# Patient Record
Sex: FEMALE | Race: WHITE | Employment: OTHER | RURAL
[De-identification: names, ages, dates, MRNs, and addresses within clinical notes are randomized per-mention and may not be internally consistent; named-entity substitution may affect disease eponyms.]

---

## 2017-02-17 RX ORDER — SIMVASTATIN 20 MG/1
20 TABLET, FILM COATED ORAL
Qty: 30 TAB | Refills: 0 | Status: SHIPPED | OUTPATIENT
Start: 2017-02-17 | End: 2017-06-08 | Stop reason: SDUPTHER

## 2017-04-12 RX ORDER — METOPROLOL TARTRATE 50 MG/1
50 TABLET ORAL 2 TIMES DAILY
Qty: 180 TAB | Refills: 1 | Status: SHIPPED | OUTPATIENT
Start: 2017-04-12

## 2017-06-09 RX ORDER — SIMVASTATIN 20 MG/1
20 TABLET, FILM COATED ORAL
Qty: 90 TAB | Refills: 1 | Status: SHIPPED | OUTPATIENT
Start: 2017-06-09 | End: 2018-02-24 | Stop reason: SDUPTHER

## 2017-06-09 RX ORDER — OMEPRAZOLE 20 MG/1
20 CAPSULE, DELAYED RELEASE ORAL DAILY
Qty: 90 CAP | Refills: 1 | Status: SHIPPED | OUTPATIENT
Start: 2017-06-09 | End: 2018-02-24 | Stop reason: SDUPTHER

## 2017-06-09 RX ORDER — CLOPIDOGREL BISULFATE 75 MG/1
75 TABLET ORAL DAILY
Qty: 90 TAB | Refills: 1 | Status: SHIPPED | OUTPATIENT
Start: 2017-06-09 | End: 2018-02-24 | Stop reason: SDUPTHER

## 2017-06-09 RX ORDER — AMLODIPINE BESYLATE 5 MG/1
5 TABLET ORAL DAILY
Qty: 90 TAB | Refills: 1 | Status: SHIPPED | OUTPATIENT
Start: 2017-06-09 | End: 2018-02-24 | Stop reason: SDUPTHER

## 2017-06-21 RX ORDER — NITROGLYCERIN 0.4 MG/1
0.4 TABLET SUBLINGUAL
Qty: 30 TAB | Refills: 2 | Status: SHIPPED | OUTPATIENT
Start: 2017-06-21 | End: 2018-09-28 | Stop reason: SDUPTHER

## 2018-08-27 ENCOUNTER — TELEPHONE (OUTPATIENT)
Dept: FAMILY MEDICINE CLINIC | Age: 68
End: 2018-08-27

## 2018-10-10 ENCOUNTER — DOCUMENTATION ONLY (OUTPATIENT)
Dept: CARDIOLOGY CLINIC | Age: 68
End: 2018-10-10

## 2018-10-10 NOTE — PROGRESS NOTES
Per fax request (2 identifiers) - last ofc note and ekg faxed to:    [facility]  ATTN:  Marine Ganser, FNP-BC  [address] [place] [address], [facility]   [phone]  Fax  [phone]

## 2021-08-04 ENCOUNTER — HOSPITAL ENCOUNTER (OUTPATIENT)
Dept: PREADMISSION TESTING | Age: 71
Discharge: HOME | End: 2021-08-04
Payer: MEDICARE

## 2021-08-04 ENCOUNTER — HOSPITAL ENCOUNTER (OUTPATIENT)
Dept: GENERAL RADIOLOGY | Age: 71
Discharge: HOME | End: 2021-08-04
Attending: INTERNAL MEDICINE
Payer: MEDICARE

## 2021-08-04 VITALS
TEMPERATURE: 98.1 F | WEIGHT: 167 LBS | RESPIRATION RATE: 18 BRPM | BODY MASS INDEX: 28.51 KG/M2 | DIASTOLIC BLOOD PRESSURE: 76 MMHG | HEIGHT: 64 IN | SYSTOLIC BLOOD PRESSURE: 114 MMHG | HEART RATE: 50 BPM

## 2021-08-04 LAB
ANION GAP SERPL CALC-SCNC: 3 MMOL/L (ref 5–15)
BUN SERPL-MCNC: 23 MG/DL (ref 6–20)
BUN/CREAT SERPL: 24 (ref 12–20)
CA-I BLD-MCNC: 9 MG/DL (ref 8.5–10.1)
CHLORIDE SERPL-SCNC: 107 MMOL/L (ref 97–108)
CO2 SERPL-SCNC: 32 MMOL/L (ref 21–32)
CREAT SERPL-MCNC: 0.94 MG/DL (ref 0.55–1.02)
ERYTHROCYTE [DISTWIDTH] IN BLOOD BY AUTOMATED COUNT: 13.2 % (ref 11.5–14.5)
GLUCOSE SERPL-MCNC: 92 MG/DL (ref 65–100)
HCT VFR BLD AUTO: 38.3 % (ref 35–47)
HGB BLD-MCNC: 11.7 G/DL (ref 11.5–16)
MCH RBC QN AUTO: 29 PG (ref 26–34)
MCHC RBC AUTO-ENTMCNC: 30.5 G/DL (ref 30–36.5)
MCV RBC AUTO: 94.8 FL (ref 80–99)
NRBC # BLD: 0 K/UL (ref 0–0.01)
NRBC BLD-RTO: 0 PER 100 WBC
PLATELET # BLD AUTO: 118 K/UL (ref 150–400)
PMV BLD AUTO: 11.9 FL (ref 8.9–12.9)
POTASSIUM SERPL-SCNC: 3.9 MMOL/L (ref 3.5–5.1)
RBC # BLD AUTO: 4.04 M/UL (ref 3.8–5.2)
SODIUM SERPL-SCNC: 142 MMOL/L (ref 136–145)
WBC # BLD AUTO: 4.1 K/UL (ref 3.6–11)

## 2021-08-04 PROCEDURE — 71046 X-RAY EXAM CHEST 2 VIEWS: CPT

## 2021-08-04 PROCEDURE — 86901 BLOOD TYPING SEROLOGIC RH(D): CPT

## 2021-08-04 PROCEDURE — 36415 COLL VENOUS BLD VENIPUNCTURE: CPT

## 2021-08-04 PROCEDURE — 85027 COMPLETE CBC AUTOMATED: CPT

## 2021-08-04 PROCEDURE — 86923 COMPATIBILITY TEST ELECTRIC: CPT

## 2021-08-04 PROCEDURE — 80048 BASIC METABOLIC PNL TOTAL CA: CPT

## 2021-08-04 RX ORDER — ISOSORBIDE MONONITRATE 60 MG/1
60 TABLET, EXTENDED RELEASE ORAL
COMMUNITY

## 2021-08-04 NOTE — PROGRESS NOTES
Noted in Dr Lesli Yadav office note pt needs BMP and Chest xray, and will also need iodine contrast allergy prep. Called and spoke to RAUL Mariee RN at Dr. Geno Mccoy office to confirm and it was not on the order, verbal order given for chest xray and BMP. She will call Dr Koko Cox and find out about prep for iodine allergy.

## 2021-08-04 NOTE — PROGRESS NOTES
Dr Jerri Chandler office called and stated predinsone will be called into [facility] and instructions for patient use, pt made aware.

## 2021-08-04 NOTE — PROGRESS NOTES
Pt states has no warning when passes out due to heart issues going on, please stand with pt and assist her to stand or transfer.

## 2021-08-05 NOTE — PROGRESS NOTES
Spoke with Marcia Alvarenga at Dr Teja Wilkinson office,  regarding abnormal labs  Platelet 712  Bun 23  BUN/Creatinine 24

## 2021-08-09 ENCOUNTER — APPOINTMENT (OUTPATIENT)
Dept: GENERAL RADIOLOGY | Age: 71
End: 2021-08-09
Attending: INTERNAL MEDICINE
Payer: MEDICARE

## 2021-08-09 ENCOUNTER — HOSPITAL ENCOUNTER (OUTPATIENT)
Age: 71
Setting detail: OBSERVATION
Discharge: HOME | End: 2021-08-10
Attending: INTERNAL MEDICINE | Admitting: INTERNAL MEDICINE
Payer: MEDICARE

## 2021-08-09 DIAGNOSIS — T82.128A: ICD-10-CM

## 2021-08-09 DIAGNOSIS — T82.110D PACEMAKER LEAD MALFUNCTION, SUBSEQUENT ENCOUNTER: Primary | ICD-10-CM

## 2021-08-09 PROBLEM — T82.110A PACEMAKER LEAD MALFUNCTION: Status: ACTIVE | Noted: 2021-08-09

## 2021-08-09 LAB
ATRIAL RATE: 62 BPM
ATRIAL RATE: 67 BPM
CALCULATED P AXIS, ECG09: 106 DEGREES
CALCULATED P AXIS, ECG09: 55 DEGREES
CALCULATED R AXIS, ECG10: 29 DEGREES
CALCULATED R AXIS, ECG10: 41 DEGREES
CALCULATED T AXIS, ECG11: 22 DEGREES
CALCULATED T AXIS, ECG11: 9 DEGREES
DIAGNOSIS, 93000: NORMAL
DIAGNOSIS, 93000: NORMAL
GLUCOSE BLD STRIP.AUTO-MCNC: 186 MG/DL (ref 65–117)
P-R INTERVAL, ECG05: 156 MS
P-R INTERVAL, ECG05: 158 MS
PERFORMED BY, TECHID: ABNORMAL
Q-T INTERVAL, ECG07: 430 MS
Q-T INTERVAL, ECG07: 448 MS
QRS DURATION, ECG06: 102 MS
QRS DURATION, ECG06: 106 MS
QTC CALCULATION (BEZET), ECG08: 454 MS
QTC CALCULATION (BEZET), ECG08: 454 MS
VENTRICULAR RATE, ECG03: 62 BPM
VENTRICULAR RATE, ECG03: 67 BPM

## 2021-08-09 PROCEDURE — 74011250637 HC RX REV CODE- 250/637: Performed by: INTERNAL MEDICINE

## 2021-08-09 PROCEDURE — 99218 HC RM OBSERVATION: CPT

## 2021-08-09 PROCEDURE — 77030018729 HC ELECTRD DEFIB PAD CARD -B: Performed by: INTERNAL MEDICINE

## 2021-08-09 PROCEDURE — 74011000250 HC RX REV CODE- 250: Performed by: INTERNAL MEDICINE

## 2021-08-09 PROCEDURE — 82962 GLUCOSE BLOOD TEST: CPT

## 2021-08-09 PROCEDURE — 99152 MOD SED SAME PHYS/QHP 5/>YRS: CPT | Performed by: INTERNAL MEDICINE

## 2021-08-09 PROCEDURE — 71045 X-RAY EXAM CHEST 1 VIEW: CPT

## 2021-08-09 PROCEDURE — 36415 COLL VENOUS BLD VENIPUNCTURE: CPT

## 2021-08-09 PROCEDURE — 74011000636 HC RX REV CODE- 636: Performed by: INTERNAL MEDICINE

## 2021-08-09 PROCEDURE — 75820 VEIN X-RAY ARM/LEG: CPT | Performed by: INTERNAL MEDICINE

## 2021-08-09 PROCEDURE — 76210000030 HC REC RM PH II 5.5 TO 6 HR: Performed by: INTERNAL MEDICINE

## 2021-08-09 PROCEDURE — 93005 ELECTROCARDIOGRAM TRACING: CPT

## 2021-08-09 PROCEDURE — 99153 MOD SED SAME PHYS/QHP EA: CPT | Performed by: INTERNAL MEDICINE

## 2021-08-09 PROCEDURE — 76210000061 HC REC RM PH II EA ADDL 0.5 >10HR: Performed by: INTERNAL MEDICINE

## 2021-08-09 PROCEDURE — 76937 US GUIDE VASCULAR ACCESS: CPT | Performed by: INTERNAL MEDICINE

## 2021-08-09 PROCEDURE — C1769 GUIDE WIRE: HCPCS | Performed by: INTERNAL MEDICINE

## 2021-08-09 PROCEDURE — 74011250636 HC RX REV CODE- 250/636: Performed by: INTERNAL MEDICINE

## 2021-08-09 RX ORDER — AMLODIPINE BESYLATE 5 MG/1
5 TABLET ORAL DAILY
Status: DISCONTINUED | OUTPATIENT
Start: 2021-08-10 | End: 2021-08-11 | Stop reason: HOSPADM

## 2021-08-09 RX ORDER — ISOSORBIDE MONONITRATE 30 MG/1
60 TABLET, EXTENDED RELEASE ORAL
Status: DISCONTINUED | OUTPATIENT
Start: 2021-08-10 | End: 2021-08-11 | Stop reason: HOSPADM

## 2021-08-09 RX ORDER — ACETAMINOPHEN 650 MG/1
650 SUPPOSITORY RECTAL
Status: DISCONTINUED | OUTPATIENT
Start: 2021-08-09 | End: 2021-08-11 | Stop reason: HOSPADM

## 2021-08-09 RX ORDER — LIDOCAINE HYDROCHLORIDE 10 MG/ML
INJECTION INFILTRATION; PERINEURAL AS NEEDED
Status: DISCONTINUED | OUTPATIENT
Start: 2021-08-09 | End: 2021-08-09 | Stop reason: HOSPADM

## 2021-08-09 RX ORDER — CLOPIDOGREL BISULFATE 75 MG/1
75 TABLET ORAL DAILY
Status: DISCONTINUED | OUTPATIENT
Start: 2021-08-10 | End: 2021-08-11 | Stop reason: HOSPADM

## 2021-08-09 RX ORDER — METOPROLOL TARTRATE 50 MG/1
50 TABLET ORAL 2 TIMES DAILY
Status: DISCONTINUED | OUTPATIENT
Start: 2021-08-09 | End: 2021-08-11 | Stop reason: HOSPADM

## 2021-08-09 RX ORDER — PANTOPRAZOLE SODIUM 40 MG/1
40 TABLET, DELAYED RELEASE ORAL DAILY
Status: DISCONTINUED | OUTPATIENT
Start: 2021-08-10 | End: 2021-08-11 | Stop reason: HOSPADM

## 2021-08-09 RX ORDER — ONDANSETRON 4 MG/1
4 TABLET, ORALLY DISINTEGRATING ORAL
Status: DISCONTINUED | OUTPATIENT
Start: 2021-08-09 | End: 2021-08-11 | Stop reason: HOSPADM

## 2021-08-09 RX ORDER — OXYCODONE HYDROCHLORIDE 5 MG/1
5 TABLET ORAL
Status: DISCONTINUED | OUTPATIENT
Start: 2021-08-09 | End: 2021-08-11 | Stop reason: HOSPADM

## 2021-08-09 RX ORDER — LISINOPRIL 5 MG/1
2.5 TABLET ORAL DAILY
Status: DISCONTINUED | OUTPATIENT
Start: 2021-08-10 | End: 2021-08-11 | Stop reason: HOSPADM

## 2021-08-09 RX ORDER — POLYETHYLENE GLYCOL 3350 17 G/17G
17 POWDER, FOR SOLUTION ORAL DAILY PRN
Status: DISCONTINUED | OUTPATIENT
Start: 2021-08-09 | End: 2021-08-11 | Stop reason: HOSPADM

## 2021-08-09 RX ORDER — ACETAMINOPHEN 325 MG/1
650 TABLET ORAL
Status: DISCONTINUED | OUTPATIENT
Start: 2021-08-09 | End: 2021-08-09 | Stop reason: SDUPTHER

## 2021-08-09 RX ORDER — NITROGLYCERIN 0.4 MG/1
0.4 TABLET SUBLINGUAL AS NEEDED
Status: DISCONTINUED | OUTPATIENT
Start: 2021-08-09 | End: 2021-08-11 | Stop reason: HOSPADM

## 2021-08-09 RX ORDER — ACETAMINOPHEN 500 MG
1000 TABLET ORAL ONCE
Status: COMPLETED | OUTPATIENT
Start: 2021-08-09 | End: 2021-08-09

## 2021-08-09 RX ORDER — DIPHENHYDRAMINE HYDROCHLORIDE 50 MG/ML
INJECTION, SOLUTION INTRAMUSCULAR; INTRAVENOUS AS NEEDED
Status: DISCONTINUED | OUTPATIENT
Start: 2021-08-09 | End: 2021-08-09 | Stop reason: HOSPADM

## 2021-08-09 RX ORDER — CITALOPRAM 10 MG/1
10 TABLET ORAL DAILY
Status: DISCONTINUED | OUTPATIENT
Start: 2021-08-10 | End: 2021-08-11 | Stop reason: HOSPADM

## 2021-08-09 RX ORDER — SIMVASTATIN 10 MG/1
20 TABLET, FILM COATED ORAL
Status: DISCONTINUED | OUTPATIENT
Start: 2021-08-09 | End: 2021-08-11 | Stop reason: HOSPADM

## 2021-08-09 RX ORDER — ALBUTEROL SULFATE 90 UG/1
1 AEROSOL, METERED RESPIRATORY (INHALATION)
Status: DISCONTINUED | OUTPATIENT
Start: 2021-08-09 | End: 2021-08-11 | Stop reason: HOSPADM

## 2021-08-09 RX ORDER — SODIUM CHLORIDE 0.9 % (FLUSH) 0.9 %
5-40 SYRINGE (ML) INJECTION EVERY 8 HOURS
Status: DISCONTINUED | OUTPATIENT
Start: 2021-08-09 | End: 2021-08-11 | Stop reason: HOSPADM

## 2021-08-09 RX ORDER — SODIUM CHLORIDE 9 MG/ML
20 INJECTION, SOLUTION INTRAVENOUS CONTINUOUS
Status: DISCONTINUED | OUTPATIENT
Start: 2021-08-09 | End: 2021-08-11 | Stop reason: HOSPADM

## 2021-08-09 RX ORDER — MIDAZOLAM HYDROCHLORIDE 1 MG/ML
INJECTION INTRAMUSCULAR; INTRAVENOUS AS NEEDED
Status: DISCONTINUED | OUTPATIENT
Start: 2021-08-09 | End: 2021-08-09 | Stop reason: HOSPADM

## 2021-08-09 RX ORDER — FENTANYL CITRATE 50 UG/ML
INJECTION, SOLUTION INTRAMUSCULAR; INTRAVENOUS AS NEEDED
Status: DISCONTINUED | OUTPATIENT
Start: 2021-08-09 | End: 2021-08-09 | Stop reason: HOSPADM

## 2021-08-09 RX ORDER — ACETAMINOPHEN 325 MG/1
650 TABLET ORAL
Status: DISCONTINUED | OUTPATIENT
Start: 2021-08-09 | End: 2021-08-11 | Stop reason: HOSPADM

## 2021-08-09 RX ORDER — ONDANSETRON 4 MG/1
4 TABLET, ORALLY DISINTEGRATING ORAL
Status: DISCONTINUED | OUTPATIENT
Start: 2021-08-09 | End: 2021-08-09 | Stop reason: SDUPTHER

## 2021-08-09 RX ORDER — ONDANSETRON 2 MG/ML
4 INJECTION INTRAMUSCULAR; INTRAVENOUS
Status: DISCONTINUED | OUTPATIENT
Start: 2021-08-09 | End: 2021-08-11 | Stop reason: HOSPADM

## 2021-08-09 RX ORDER — HYDROMORPHONE HYDROCHLORIDE 1 MG/ML
0.2 INJECTION, SOLUTION INTRAMUSCULAR; INTRAVENOUS; SUBCUTANEOUS
Status: DISCONTINUED | OUTPATIENT
Start: 2021-08-09 | End: 2021-08-11 | Stop reason: HOSPADM

## 2021-08-09 RX ORDER — SODIUM CHLORIDE 0.9 % (FLUSH) 0.9 %
5-40 SYRINGE (ML) INJECTION AS NEEDED
Status: DISCONTINUED | OUTPATIENT
Start: 2021-08-09 | End: 2021-08-11 | Stop reason: HOSPADM

## 2021-08-09 RX ORDER — AMOXICILLIN 250 MG
1 CAPSULE ORAL DAILY
Status: DISCONTINUED | OUTPATIENT
Start: 2021-08-10 | End: 2021-08-11 | Stop reason: HOSPADM

## 2021-08-09 RX ORDER — FUROSEMIDE 40 MG/1
20 TABLET ORAL DAILY
Status: DISCONTINUED | OUTPATIENT
Start: 2021-08-10 | End: 2021-08-11 | Stop reason: HOSPADM

## 2021-08-09 RX ADMIN — Medication 10 ML: at 22:34

## 2021-08-09 RX ADMIN — METOPROLOL TARTRATE 50 MG: 50 TABLET, FILM COATED ORAL at 22:33

## 2021-08-09 RX ADMIN — SIMVASTATIN 20 MG: 10 TABLET, FILM COATED ORAL at 22:30

## 2021-08-09 RX ADMIN — OXYCODONE 5 MG: 5 TABLET ORAL at 10:07

## 2021-08-09 RX ADMIN — ONDANSETRON 4 MG: 4 TABLET, ORALLY DISINTEGRATING ORAL at 10:19

## 2021-08-09 RX ADMIN — HYDROMORPHONE HYDROCHLORIDE 0.2 MG: 1 INJECTION, SOLUTION INTRAMUSCULAR; INTRAVENOUS; SUBCUTANEOUS at 13:39

## 2021-08-09 RX ADMIN — SODIUM CHLORIDE 20 ML/HR: 9 INJECTION, SOLUTION INTRAVENOUS at 07:31

## 2021-08-09 RX ADMIN — ACETAMINOPHEN 1000 MG: 500 TABLET ORAL at 07:31

## 2021-08-09 RX ADMIN — ACETAMINOPHEN 650 MG: 325 TABLET ORAL at 10:07

## 2021-08-09 RX ADMIN — Medication 10 ML: at 13:40

## 2021-08-09 RX ADMIN — HYDROMORPHONE HYDROCHLORIDE 0.2 MG: 1 INJECTION, SOLUTION INTRAMUSCULAR; INTRAVENOUS; SUBCUTANEOUS at 22:44

## 2021-08-09 RX ADMIN — ONDANSETRON 4 MG: 4 TABLET, ORALLY DISINTEGRATING ORAL at 19:34

## 2021-08-09 RX ADMIN — SODIUM CHLORIDE 1000 MG: 9 INJECTION, SOLUTION INTRAVENOUS at 08:00

## 2021-08-09 NOTE — PROGRESS NOTES
1340hrs Pt served lunch took 100% of served meal with tolerance, still complaining of pain scoring 9/10, iv dilaudid administered with tolerance will continue to monitor

## 2021-08-09 NOTE — PROGRESS NOTES
TRANSFER - OUT REPORT:    Verbal report given to [address] (on [address]  being transferred to [facility] (unit) for routine progression of care       Report consisted of patients Situation, Background, Assessment and   Recommendations(SBAR). Information from the following report(s) SBAR, Kardex, Intake/Output, MAR and Recent Results was reviewed with the receiving nurse. Lines:   Peripheral IV 08/09/21 Left Antecubital (Active)   Site Assessment Clean, dry, & intact 08/09/21 1709   Phlebitis Assessment 0 08/09/21 1709   Infiltration Assessment 0 08/09/21 1709   Dressing Status Clean, dry, & intact 08/09/21 1709   Dressing Type Transparent 08/09/21 1709   Hub Color/Line Status Patent 08/09/21 1709   Alcohol Cap Used Yes 08/09/21 1709        Opportunity for questions and clarification was provided.       Patient transported with:   Registered Nurse

## 2021-08-09 NOTE — PROGRESS NOTES
Dr Larisa Dumont in to see patient. CXR report reviewed. Patient continues to c/o pain left neck. Orders received.

## 2021-08-09 NOTE — PROGRESS NOTES
Portable CXR and EKG completed as ordered. Pain and nausea medications given. Repositioned for comfort. No acute distress noted.

## 2021-08-09 NOTE — PROGRESS NOTES
Pt received from [address] of 9/10 pain on the left neck region, Dr Monika Jimenes present ordered 0.2mg of dilaudid, pt assisted to and from the bathroom with no complications, settled in bed with call bell in reach. Lunch ordered will admin pain med once available.

## 2021-08-09 NOTE — PROGRESS NOTES
1600hrs Dr Drew Park called, gave ok to admit patient to med tele unit, nurse supervisor made aware, pt currently calm and sleeping

## 2021-08-09 NOTE — Clinical Note
A venogram was performed on the left subclavian. Injected with multiple hand injections. Injection volume  = 30 mL.

## 2021-08-09 NOTE — PROGRESS NOTES
4 eye skin assessment preformed by myself and Gaby Schwartz RN. Pt has a buries to her left lower abdomen. Pt has bug bites to her left shoulder. All other skin clean, dry and intact.

## 2021-08-09 NOTE — PROGRESS NOTES
Assumed care of the patient. Alert and responsive. Resp even and unlabored. No sob noted. Bedside report received from [address] [place] [address]. Patient complains of left neck pain 8/10. Dr. Monika Jimenes present at the bedside. Patient examined and orders received. Repositioned for comfort. VSS.

## 2021-08-10 VITALS
HEIGHT: 64 IN | RESPIRATION RATE: 18 BRPM | BODY MASS INDEX: 28.51 KG/M2 | WEIGHT: 167 LBS | HEART RATE: 64 BPM | SYSTOLIC BLOOD PRESSURE: 114 MMHG | TEMPERATURE: 97.8 F | DIASTOLIC BLOOD PRESSURE: 59 MMHG | OXYGEN SATURATION: 98 %

## 2021-08-10 LAB
GLUCOSE BLD STRIP.AUTO-MCNC: 156 MG/DL (ref 65–117)
GLUCOSE BLD STRIP.AUTO-MCNC: 159 MG/DL (ref 65–117)
PERFORMED BY, TECHID: ABNORMAL
PERFORMED BY, TECHID: ABNORMAL

## 2021-08-10 PROCEDURE — 99218 HC RM OBSERVATION: CPT

## 2021-08-10 PROCEDURE — 82962 GLUCOSE BLOOD TEST: CPT

## 2021-08-10 PROCEDURE — 74011250637 HC RX REV CODE- 250/637: Performed by: INTERNAL MEDICINE

## 2021-08-10 PROCEDURE — 74011250636 HC RX REV CODE- 250/636: Performed by: INTERNAL MEDICINE

## 2021-08-10 RX ORDER — NAPROXEN SODIUM 220 MG
220 TABLET ORAL
Qty: 10 TABLET | Refills: 0 | Status: SHIPPED
Start: 2021-08-10 | End: 2021-08-13

## 2021-08-10 RX ORDER — ASPIRIN 81 MG/1
81 TABLET ORAL DAILY
Status: DISCONTINUED | OUTPATIENT
Start: 2021-08-10 | End: 2021-08-11 | Stop reason: HOSPADM

## 2021-08-10 RX ADMIN — ONDANSETRON 4 MG: 4 TABLET, ORALLY DISINTEGRATING ORAL at 18:20

## 2021-08-10 RX ADMIN — LISINOPRIL 2.5 MG: 5 TABLET ORAL at 08:01

## 2021-08-10 RX ADMIN — OXYCODONE 5 MG: 5 TABLET ORAL at 05:32

## 2021-08-10 RX ADMIN — PANTOPRAZOLE SODIUM 40 MG: 40 TABLET, DELAYED RELEASE ORAL at 08:00

## 2021-08-10 RX ADMIN — SODIUM CHLORIDE 20 ML/HR: 9 INJECTION, SOLUTION INTRAVENOUS at 08:02

## 2021-08-10 RX ADMIN — CITALOPRAM HYDROBROMIDE 10 MG: 10 TABLET ORAL at 08:01

## 2021-08-10 RX ADMIN — HYDROMORPHONE HYDROCHLORIDE 0.2 MG: 1 INJECTION, SOLUTION INTRAMUSCULAR; INTRAVENOUS; SUBCUTANEOUS at 07:46

## 2021-08-10 RX ADMIN — Medication 10 ML: at 14:18

## 2021-08-10 RX ADMIN — HYDROMORPHONE HYDROCHLORIDE 0.2 MG: 1 INJECTION, SOLUTION INTRAMUSCULAR; INTRAVENOUS; SUBCUTANEOUS at 17:26

## 2021-08-10 RX ADMIN — OXYCODONE 5 MG: 5 TABLET ORAL at 12:31

## 2021-08-10 RX ADMIN — ASPIRIN 81 MG: 81 TABLET, COATED ORAL at 08:01

## 2021-08-10 RX ADMIN — METOPROLOL TARTRATE 50 MG: 50 TABLET, FILM COATED ORAL at 08:00

## 2021-08-10 RX ADMIN — FUROSEMIDE 20 MG: 40 TABLET ORAL at 08:00

## 2021-08-10 RX ADMIN — ISOSORBIDE MONONITRATE 60 MG: 30 TABLET, EXTENDED RELEASE ORAL at 08:00

## 2021-08-10 RX ADMIN — AMLODIPINE BESYLATE 5 MG: 5 TABLET ORAL at 08:00

## 2021-08-10 RX ADMIN — CLOPIDOGREL BISULFATE 75 MG: 75 TABLET ORAL at 08:00

## 2021-08-10 RX ADMIN — Medication 10 ML: at 05:32

## 2021-08-10 NOTE — PROGRESS NOTES
Problem: Pressure Injury - Risk of  Goal: *Prevention of pressure injury  Description: Document Eliot Scale and appropriate interventions in the flowsheet.   Outcome: Progressing Towards Goal  Note: Pressure Injury Interventions:  Sensory Interventions: Keep linens dry and wrinkle-free, Float heels    Moisture Interventions: Absorbent underpads, Apply protective barrier, creams and emollients    Activity Interventions: Increase time out of bed    Mobility Interventions: Float heels, HOB 30 degrees or less    Nutrition Interventions: Document food/fluid/supplement intake

## 2021-08-10 NOTE — DISCHARGE SUMMARY
Patient ID:  Evon Irizarry  [id number]  34 y.o.  1950    Allergies: Morphine, Adhesive, Ciprofloxacin, Iodinated contrast media, and Pcn [penicillins]    Admit Date: 8/9/2021    Discharge Date: 8/10/2021     Admitting Physician: Lucas Henson MD     Discharge Physician: Lucas Henson MD    * Admission Diagnoses: Pacemaker displacement Kiet Shear; Pacemaker lead malfunction [T82.110A]; Pacemaker lead malfunction, subsequent encounter [T82.110D]    * Discharge Diagnoses:   Hospital Problems as of 8/10/2021 Date Reviewed: 12/16/2016        Codes Class Noted - Resolved POA    Pacemaker lead malfunction ICD-10-CM: T82.110A  ICD-9-CM: 996.01  8/9/2021 - Present Unknown              * Discharge Condition: good    * Cardiology Procedures this Admission:  Venogram + attempted at RA lead revision      [facility] Course: Ms. Regi Yadav is a 69 y.o. 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 She came in for a venogram +/- attempted RA lead revision. Patient was found to have occluded left axillary/subclavian vein system. Attempts to gain access to central subclavian circulation were unsuccessful through micropuncture access, procedure was therefore completed before any skin incision was performed. Device programming was done to optimize atrial lead sensing. Post procedure patient had left pocket site pain radiating to the left shoulder and upper arm and therefore was kept for observation for a night. Pain control with analgesics. Vitals and exam remained stable, no hematoma at the site. CXR did not show any pneumothorax. Today patient tells me she is feeling better and wishes to be discharged. She does not want a prescription of analgesics as they have previously not been well tolerated. She has previously tolerated Aleve well and I have told her that it is acceptable to take PRN for only 3 days.  She will follow up with me in clinic in 2 weeks. Discharge Exam:     Visit Vitals  BP (!) 140/72 (BP 1 Location: Right upper arm, BP Patient Position: Sitting)   Pulse 70   Temp 98.2 °F (36.8 °C)   Resp 18   Ht 5' 4\" (1.626 m)   Wt 75.8 kg (167 lb)   SpO2 95%   BMI 28.67 kg/m²     General Appearance:  Well developed, well nourished,alert and oriented x 3, and individual in no acute distress. Neck: Supple. Chest:   Lungs clear to auscultation bilaterally. Cardiovascular:  Regular rate and rhythm, S1, S2 normal, no murmur. Device site is soft, mildly tender, no hematoma. Abdomen:   Soft, non-tender, bowel sounds are active. Extremities: No edema bilaterally. Skin: Warm and dry. * Disposition: Home    Discharge Medications:   Current Discharge Medication List      START taking these medications    Details   naproxen sodium (Aleve) 220 mg tablet Take 1 Tablet by mouth three (3) times daily as needed for Pain for up to 3 days. Qty: 10 Tablet, Refills: 0  Start date: 8/10/2021, End date: 8/13/2021         CONTINUE these medications which have NOT CHANGED    Details   isosorbide mononitrate ER (IMDUR) 60 mg CR tablet Take 60 mg by mouth every morning. clopidogrel (PLAVIX) 75 mg tab TAKE 1 TABLET BY MOUTH DAILY  Qty: 90 Tab, Refills: 1      nitroglycerin (NITROSTAT) 0.4 mg SL tablet ONE TABLET UNDER TONGUE AS NEEDED FOR CHEST PAIN( EVERY 5 MINUTES)  Qty: 25 Tab, Refills: 1      simvastatin (ZOCOR) 20 mg tablet TAKE 1 TABLET BY MOUTH NIGHTLY  Qty: 90 Tab, Refills: 1      omeprazole (PRILOSEC) 20 mg capsule TAKE ONE CAPSULE BY MOUTH DAILY  Qty: 90 Cap, Refills: 1      amLODIPine (NORVASC) 5 mg tablet TAKE 1 TABLET BY MOUTH DAILY  Qty: 90 Tab, Refills: 1      metoprolol tartrate (LOPRESSOR) 50 mg tablet Take 1 Tab by mouth two (2) times a day.   Qty: 180 Tab, Refills: 1      Oxygen 2 LITERS  AT NIGHT      ergocalciferol (VITAMIN D2) 50,000 unit capsule Take 2 times a week  Qty: 8 Cap, Refills: 5    Associated Diagnoses: Vitamin D deficiency      ondansetron hcl (ZOFRAN) 4 mg tablet daily. Refills: 5      insulin aspart (NOVOLOG) 100 unit/mL injection 15 Units by SubCUTAneous route once. PROAIR HFA 90 mcg/actuation inhaler 1 Puff every four (4) hours as needed. Refills: 5      furosemide (LASIX) 20 mg tablet Take 20 mg by mouth daily. Refills: 3      lisinopril (PRINIVIL, ZESTRIL) 2.5 mg tablet Take 1 Tab by mouth daily. Qty: 30 Tab, Refills: 6      citalopram (CELEXA) 10 mg tablet Take  by mouth daily. aspirin 81 mg tablet Take 81 mg by mouth. * Follow-up Care/Patient Instructions: Activity:Activity as tolerated  Diet: Cardiac Diet  Wound Care: None needed    Post Electrophysiology Procedure Discharge Instructions  Patient may take a shower without restrictions  No activity restrictions  Clinic follow up as below      Follow up with Dr Lilian Hernandes at [facility] in 2 weeks.  Appointment previously scheduled    Signed:  Sampson Plata MD  8/10/2021  12:24 PM

## 2021-08-10 NOTE — PROGRESS NOTES
92Maribell Sweet to son Chad Russell at [phone] and informed him of medicare observation letter and let him know I was not comfortable having his mom sign this medicare obs letter due to her blindness. Son understood and thanked me. Asked me to call his wife Yelena Fischer and let  her know as he has had a brain injury from previous accident. I called and spoke to Yelena Fariasunday at [phone].  Yelena Fariasunday stated her  would be here to  his mom at 6:00pm.

## 2021-08-10 NOTE — DISCHARGE INSTRUCTIONS
Procedure name: You had your venogram and attempt at RA lead revision by Dr. Suzanne Husain on 08/0921. Activity restrictions none    Driving restrictions:  - No driving for 24 hours after your procedure. Wound care:  - no specific wound care needed    When to call:  - Call the [facility] Cardiology the same day if your heart rate drops below the device's programmed rate (especially if you have a pacemaker), you develop dizziness/lightheadedness or feel like you might pass out, have a return of the symptoms you had before your pacemaker was inserted, or notice any signs of infection including: redness, swelling, active drainage, warmth at the site, or a fever (101.0°F or greater). - If you have chest pain, call the [facility] Cardiology clinic or report to the emergency department. - For the issues above or any other questions/concerns, call the [facility] Cardiology clinic at [phone] (Monday - [facility] After hours, nights, weekends, and holidays, this same number is answered by the [employer] center. Ask for the cardiologist on call. Give the  your full name and phone number with the area code. The doctor will call you back. Follow up: According to previously scheduled appointment with Dr Suzanne Husain    Medication changes: You may take PRN Aleve or tylenol for the pain for 3 days.  Resume all your previous medications

## 2021-08-10 NOTE — H&P
CARDIAC ELECTROPHYSIOLOGY HISTORY & PHYSICAL    PATIENT NAME: Umang Kennedy  YOB: 1950  AGE: 69 y.o. GENDER: female      REASON FOR ADMISSION: Observation post venogram + attempt at RV lead revision     CHIEF COMPLAINT:  Pain at device site    HISTORY OF PRESENT ILLNESS:  Umang Kennedy is a 69 y.o.  female with past medical history significant for symptomatic bradycardia s/p permanent pacemaker implantation with subsequent malfunction of RA lead who presented today for RA lead revision. Please see scanned documentation from my clinic evaluation of 7/13/21 for further details. She underwent a venogram which showed occluded L axillary subclavian system. Attempt at accessing the central circulation was unsuccessful and procedure was abandoned. Post procedure patient complaining of pain L precordial device site radiating to L shoulder and upper arm. She has had prior issues with intolerance of pain medications and requests to stay in patient while pain is controlled. Otherwise no other issues present. PAST MEDICAL HISTORY:  Past Medical History:   Diagnosis Date    Arthritis     and neuropathy    Asthma     Asthma     Bell's palsy     Benign hypertensive heart disease without heart failure     Blindness     bilat    CAD (coronary artery disease)     Coronary atherosclerosis of native coronary artery 3/2011    NonQ MI    Crohn's disease ([address]     DM (diabetes mellitus) ([address]     Gastritis 4/2012    GERD (gastroesophageal reflux disease)     Ischemic cardiomyopathy     Multinodular goiter     Peripheral neuropathy     Pulmonary hypertension ([address]     Pure hypercholesterolemia     Stroke ([address]     x 2, right weakness    Thromboembolus ([address]     Vitamin D deficiency        INPATIENT MEDICATIONS:  Home medications reviewed.     Current Outpatient Medications   Medication Instructions    amLODIPine (NORVASC) 5 mg tablet TAKE 1 TABLET BY MOUTH DAILY    aspirin 81 mg    citalopram (CELEXA) 10 mg tablet DAILY    clopidogrel (PLAVIX) 75 mg tab TAKE 1 TABLET BY MOUTH DAILY    ergocalciferol (VITAMIN D2) 50,000 unit capsule Take 2 times a week    furosemide (LASIX) 20 mg, Oral, DAILY    insulin aspart U-100 (NOVOLOG) 15 Units, SubCUTAneous, ONCE    isosorbide mononitrate ER (IMDUR) 60 mg, Oral, 7AM    lisinopriL (PRINIVIL, ZESTRIL) 2.5 mg, Oral, DAILY    metoprolol tartrate (LOPRESSOR) 50 mg, Oral, 2 TIMES DAILY    nitroglycerin (NITROSTAT) 0.4 mg SL tablet ONE TABLET UNDER TONGUE AS NEEDED FOR CHEST PAIN( EVERY 5 MINUTES)    omeprazole (PRILOSEC) 20 mg capsule TAKE ONE CAPSULE BY MOUTH DAILY    ondansetron hcl (ZOFRAN) 4 mg tablet DAILY    Oxygen 2 LITERS  AT NIGHT    PROAIR HFA 90 mcg/actuation inhaler 1 Puff, EVERY 4 HOURS AS NEEDED    simvastatin (ZOCOR) 20 mg tablet TAKE 1 TABLET BY MOUTH NIGHTLY         ALLERGIES:  Allergies reviewed with the patient,  Allergies   Allergen Reactions    Morphine Nausea and Vomiting     Violent.  Adhesive Other (comments)     Tears skin, no bandaids, use paper tape    Ciprofloxacin Unknown (comments)    Iodinated Contrast Media Hives    Pcn [Penicillins] Anaphylaxis    . FAMILY HISTORY:    No known family history of sudden cardiac death, syncope, arrhythmias, pacemakers, or ICDs. Family History   Problem Relation Age of Onset    Diabetes Mother     Heart Disease Mother     Cancer Mother         breast cancer    Diabetes Father     Heart Disease Father          SOCIAL HISTORY:    Social History     Tobacco Use    Smoking status: Never Smoker    Smokeless tobacco: Never Used   Vaping Use    Vaping Use: Never assessed   Substance Use Topics    Alcohol use: No    Drug use: No       REVIEW OF SYSTEMS:  Complete review of systems performed, pertinents noted above, all other systems are negative.       PHYSICAL EXAMINATION:    Visit Vitals  BP (!) 146/71 (BP 1 Location: Right upper arm, BP Patient Position: At rest) Pulse 64   Temp 98.4 °F (36.9 °C)   Resp 18   Ht 5' 4\" (1.626 m)   Wt 75.8 kg (167 lb)   SpO2 96%   BMI 28.67 kg/m²     General: awake, alert; appears to be of stated age; nokrmal body habitus; non-toxic appearing; in no acute distress; cooperative and responding appropriately to questions; comfortable lying in hospital bed  HEENT: conjunctivae not injected; sclera anicteric; mucous membranes moist  Neck: supple; no JVD  Heart: regular rate and rhythm; normal S1 and S2 heart sounds; no murmurs/rubs/gallops or ejection clicks appreciated. Pacemaker site has point tenderness but otherwise soft, no hematoma. Lungs: clear breath sounds bilaterally; no wheezing/rales/rhonchi or pleural rubs appreciated; unlabored effort of breathing  Abdomen: soft, non-tender, non-distended; active bowel sounds present; no hepatomegaly appreciated  Extremities: warm and well perfused x 4; no gross deformities; no pitting pedal/pretibial edema bilaterally; radial and pedal pulses 2+ bilaterally  Skin: normal skin color, texture, and turgor; no lesions or rashes, no petechiae or purpura; no cyanosis; no clubbing of the fingernails  Neurologic: no gross focal deficits        Recent labs results and imaging reviewed. CXR reviewed      IMPRESSION AND PLAN:  1. RA lead malfunction of permanent pacemaker: Inability to access central circulation in an occluded left axillary/subclavian vein system. I have made some device programming changes and will assess response in clinic. Depending on the response, patient may be followed vs need a lead extraction procedure scheduled. 2. Post operative pain: Likely hypersensitivity to pain. CXR reviewed, no pneumothorax. Otherwise vitally stable and since the device pocket was not accessed, no concern for hematoma. Likely nerve impingement contributing. PRN analgesics as tolerated - wean as tolerated. Monitor overnight on telemetry.  She will be reassess in am.      Derrell Resendiz MD  8/10/2021

## 2021-08-10 NOTE — PROGRESS NOTES
1400  Per nurse manager, patient to be discharged to home, but because she is blind, her son will be in to pick her up after 7:30 pm this evening.

## 2021-08-10 NOTE — PROGRESS NOTES
Observation notice provided in writing to patient and/or caregiver as well as verbal explanation of the policy. Patients who are in outpatient status also receive the Observation notice. Patient has received notice and or patient representative has received via secure email, fax, or certified mail based on patient representative's preference.        GERBER Arellano   negative...

## 2021-08-10 NOTE — PROGRESS NOTES
.I have reviewed discharge instructions with the patient. The patient  verbalized understanding.  The patient is going home self care with her son picking her up at around 6pm

## 2021-08-13 LAB
ABO + RH BLD: NORMAL
BLD PROD TYP BPU: NORMAL
BLOOD GROUP ANTIBODIES SERPL: NEGATIVE
BPU ID: NORMAL
CROSSMATCH RESULT,%XM: NORMAL
SPECIMEN EXP DATE BLD: NORMAL
STATUS OF UNIT,%ST: NORMAL
TRANSFUSION STATUS PATIENT QL: NORMAL
UNIT DIVISION, %UDIV: 0

## 2023-04-24 ENCOUNTER — APPOINTMENT (OUTPATIENT)
Dept: GENERAL RADIOLOGY | Age: 73
End: 2023-04-24
Attending: NURSE PRACTITIONER
Payer: MEDICARE

## 2023-04-24 ENCOUNTER — HOSPITAL ENCOUNTER (EMERGENCY)
Age: 73
Discharge: HOME | End: 2023-04-24
Attending: EMERGENCY MEDICINE
Payer: MEDICARE

## 2023-04-24 VITALS
HEART RATE: 62 BPM | DIASTOLIC BLOOD PRESSURE: 93 MMHG | HEIGHT: 64 IN | SYSTOLIC BLOOD PRESSURE: 195 MMHG | OXYGEN SATURATION: 95 % | RESPIRATION RATE: 18 BRPM | TEMPERATURE: 98.8 F | WEIGHT: 181 LBS | BODY MASS INDEX: 30.9 KG/M2

## 2023-04-24 DIAGNOSIS — L03.032 CELLULITIS OF TOE OF LEFT FOOT: ICD-10-CM

## 2023-04-24 DIAGNOSIS — S92.502A CLOSED FRACTURE OF PHALANX OF LEFT FOURTH TOE, INITIAL ENCOUNTER: Primary | ICD-10-CM

## 2023-04-24 LAB
ALBUMIN SERPL-MCNC: 3.7 G/DL (ref 3.5–5)
ALBUMIN/GLOB SERPL: 1 (ref 1.1–2.2)
ALP SERPL-CCNC: 108 U/L (ref 45–117)
ALT SERPL-CCNC: 13 U/L (ref 12–78)
ANION GAP SERPL CALC-SCNC: 1 MMOL/L (ref 5–15)
AST SERPL-CCNC: 14 U/L (ref 15–37)
BASOPHILS # BLD: 0.1 K/UL (ref 0–0.1)
BASOPHILS NFR BLD: 1 % (ref 0–1)
BILIRUB SERPL-MCNC: 0.5 MG/DL (ref 0.2–1)
BUN SERPL-MCNC: 26 MG/DL (ref 6–20)
BUN/CREAT SERPL: 23 (ref 12–20)
CALCIUM SERPL-MCNC: 9.3 MG/DL (ref 8.5–10.1)
CHLORIDE SERPL-SCNC: 105 MMOL/L (ref 97–108)
CO2 SERPL-SCNC: 32 MMOL/L (ref 21–32)
COMMENT, HOLDF: NORMAL
CREAT SERPL-MCNC: 1.13 MG/DL (ref 0.55–1.02)
DIFFERENTIAL METHOD BLD: NORMAL
EOSINOPHIL # BLD: 0.1 K/UL (ref 0–0.4)
EOSINOPHIL NFR BLD: 1 % (ref 0–7)
ERYTHROCYTE [DISTWIDTH] IN BLOOD BY AUTOMATED COUNT: 13.7 % (ref 11.5–14.5)
GLOBULIN SER CALC-MCNC: 3.6 G/DL (ref 2–4)
GLUCOSE SERPL-MCNC: 169 MG/DL (ref 65–100)
HCT VFR BLD AUTO: 41.9 % (ref 35–47)
HGB BLD-MCNC: 13.4 G/DL (ref 11.5–16)
IMM GRANULOCYTES # BLD AUTO: 0 K/UL (ref 0–0.04)
IMM GRANULOCYTES NFR BLD AUTO: 0 % (ref 0–0.5)
LACTATE BLD-SCNC: 0.86 MMOL/L (ref 0.4–2)
LYMPHOCYTES # BLD: 1.4 K/UL (ref 0.8–3.5)
LYMPHOCYTES NFR BLD: 20 % (ref 12–49)
MCH RBC QN AUTO: 29.8 PG (ref 26–34)
MCHC RBC AUTO-ENTMCNC: 32 G/DL (ref 30–36.5)
MCV RBC AUTO: 93.3 FL (ref 80–99)
MONOCYTES # BLD: 0.6 K/UL (ref 0–1)
MONOCYTES NFR BLD: 8 % (ref 5–13)
NEUTS SEG # BLD: 4.8 K/UL (ref 1.8–8)
NEUTS SEG NFR BLD: 70 % (ref 32–75)
NRBC # BLD: 0 K/UL (ref 0–0.01)
NRBC BLD-RTO: 0 PER 100 WBC
PLATELET # BLD AUTO: 155 K/UL (ref 150–400)
PMV BLD AUTO: 11.7 FL (ref 8.9–12.9)
POTASSIUM SERPL-SCNC: 3.8 MMOL/L (ref 3.5–5.1)
PROT SERPL-MCNC: 7.3 G/DL (ref 6.4–8.2)
RBC # BLD AUTO: 4.49 M/UL (ref 3.8–5.2)
SAMPLES BEING HELD,HOLD: NORMAL
SODIUM SERPL-SCNC: 138 MMOL/L (ref 136–145)
UR CULT HOLD, URHOLD: NORMAL
WBC # BLD AUTO: 6.9 K/UL (ref 3.6–11)

## 2023-04-24 PROCEDURE — 74011250637 HC RX REV CODE- 250/637: Performed by: NURSE PRACTITIONER

## 2023-04-24 PROCEDURE — 87040 BLOOD CULTURE FOR BACTERIA: CPT

## 2023-04-24 PROCEDURE — 99285 EMERGENCY DEPT VISIT HI MDM: CPT

## 2023-04-24 PROCEDURE — 85025 COMPLETE CBC W/AUTO DIFF WBC: CPT

## 2023-04-24 PROCEDURE — 73630 X-RAY EXAM OF FOOT: CPT

## 2023-04-24 PROCEDURE — 36415 COLL VENOUS BLD VENIPUNCTURE: CPT

## 2023-04-24 PROCEDURE — 80053 COMPREHEN METABOLIC PANEL: CPT

## 2023-04-24 PROCEDURE — 83605 ASSAY OF LACTIC ACID: CPT

## 2023-04-24 RX ORDER — HYDROCODONE BITARTRATE AND ACETAMINOPHEN 5; 325 MG/1; MG/1
1 TABLET ORAL ONCE
Status: COMPLETED | OUTPATIENT
Start: 2023-04-24 | End: 2023-04-24

## 2023-04-24 RX ORDER — DOXYCYCLINE HYCLATE 100 MG
100 TABLET ORAL 2 TIMES DAILY
Qty: 14 TABLET | Refills: 0 | Status: SHIPPED | OUTPATIENT
Start: 2023-04-24 | End: 2023-05-01

## 2023-04-24 RX ORDER — HYDROCODONE BITARTRATE AND ACETAMINOPHEN 5; 325 MG/1; MG/1
1 TABLET ORAL
Qty: 10 TABLET | Refills: 0 | Status: SHIPPED | OUTPATIENT
Start: 2023-04-24 | End: 2023-04-27

## 2023-04-24 RX ADMIN — HYDROCODONE BITARTRATE AND ACETAMINOPHEN 1 TABLET: 5; 325 TABLET ORAL at 12:54

## 2023-04-24 NOTE — ED NOTES
Patient does not appear to be in any acute distress/shows no evidence of clinical instability at this time. Provider has reviewed discharge instructions with the patient. The patient verbalized understanding instructions as well as need for follow up for any further symptoms. Discharge papers given, boot provided education provided, and any questions answered.

## 2023-04-24 NOTE — ED TRIAGE NOTES
Patient arrives with c/o left foot pain, bruising and swelling. Reports having red streak appear on leg 4 to five hours after injury. States that she hit her foot on brass bed last night. Patient is legally blind in both eyes. Patient able to bear weight, with increased pain.

## 2023-04-24 NOTE — ED PROVIDER NOTES
42-year-old female who presents to the emergency room with complaints of left third and fourth toe pain, erythema and streaking to the left foot after striking her foot onto a breast bed. Patient is visually impaired and when she was walking in her bedroom she struck her toes on her breast because immediate pain, swelling, erythema. States she is able to weight-bear but has worsening pain. States her pain is 10 out of 10 despite the use of Aleve. Patient is legally blind in both eyes. Is also a diabetic. There are no further complaints at this time.        Past Medical History:   Diagnosis Date    Arthritis     and neuropathy    Asthma     Asthma     Lowery's palsy     Benign hypertensive heart disease without heart failure     Blindness     bilat    CAD (coronary artery disease)     Coronary atherosclerosis of native coronary artery 3/2011    NonQ MI    Crohn's disease ([facility] [address]     DM (diabetes mellitus) ([facility] [address]     Gastritis 4/2012    GERD (gastroesophageal reflux disease)     Ischemic cardiomyopathy     Multinodular goiter     Peripheral neuropathy     Pulmonary hypertension (HCC)     Pure hypercholesterolemia     Stroke (HCC)     x 2, right weakness    Thromboembolus (HCC)     Vitamin D deficiency        Past Surgical History:   Procedure Laterality Date    ECHO 2D ADULT  3/2011    severe LVF, EF 55%, LAE, mild MR, 2+ TR, PA 80-90    HX APPENDECTOMY      HX CATARACT REMOVAL      HX CORNEAL TRANSPLANT      HX CYST REMOVAL  1971    excision of ganglion cyst (R) hand    HX GASTRIC BYPASS      Subsequent reversal    HX GYN      c section x 3    HX GYN      hysterectomy    HX HEART CATHETERIZATION  3/2011    Ant hypo EF 40%, mLAD 99%, OM 25%, RCA 30%    HX HEART CATHETERIZATION  9/2013    LVEDP 9, EF50-55%, D2 70%, OM1 35%, RCA 30%    HX HEENT      right eye surgery    HX HEMORRHOIDECTOMY  1973    HX ORTHOPAEDIC      left shoulder surgery    HX OTHER SURGICAL      ganglion cyst removed right hand    HX PACEMAKER  3/2011 DDD, [employer] [id number]    HX PTCA  3/2011    Promus 3.5X23 SUSAN-LAD    HX REFRACTIVE SURGERY      NM CARDIAC SPECT W STRS/REST MULT  6/2012    Normal perfusion, EF 54%    ID ABDOMEN SURGERY PROC UNLISTED  1991    exploratory laparotomy    ID CARDIAC SURG PROCEDURE UNLIST      stents placed    ID CARDIAC SURG PROCEDURE UNLIST  2011    pacemaker placement         Family History:   Problem Relation Age of Onset    Diabetes Mother     Heart Disease Mother     Cancer Mother         breast cancer    Diabetes Father     Heart Disease Father        Social History     Socioeconomic History    Marital status:      Spouse name: Not on file    Number of children: Not on file    Years of education: Not on file    Highest education level: Not on file   Occupational History    Not on file   Tobacco Use    Smoking status: Never    Smokeless tobacco: Never   Vaping Use    Vaping Use: Not on file   Substance and Sexual Activity    Alcohol use: No    Drug use: No    Sexual activity: Not on file   Other Topics Concern     Service No    Blood Transfusions Yes    Caffeine Concern No    Occupational Exposure No    Hobby Hazards No    Sleep Concern Yes     Comment: cant sleep    Stress Concern Yes     Comment: health issues    Weight Concern Yes     Comment: gaining weight    Special Diet Yes     Comment: watching carbs    Back Care No    Exercise No    Bike Helmet No    Seat Belt Yes    Self-Exams Yes   Social History Narrative    Not on file     Social Determinants of Health     Financial Resource Strain: Not on file   Food Insecurity: Not on file   Transportation Needs: Not on file   Physical Activity: Not on file   Stress: Not on file   Social Connections: Not on file   Intimate Partner Violence: Not on file   Housing Stability: Not on file         ALLERGIES: Morphine, Adhesive, Ciprofloxacin, Iodinated contrast media, and Pcn [penicillins]    Review of Systems   Constitutional:  Negative for fatigue and fever.    HENT: Negative for congestion, sore throat and trouble swallowing. Eyes:  Positive for visual disturbance (legally blind). Negative for photophobia and redness. Respiratory:  Negative for cough, chest tightness and shortness of breath. Cardiovascular:  Negative for chest pain. Gastrointestinal:  Negative for abdominal distention, abdominal pain, nausea and vomiting. Endocrine: Negative. Genitourinary:  Negative for difficulty urinating. Musculoskeletal:  Positive for arthralgias (left third and fourth toes with pain and swelling, increased erythema. ). Negative for back pain, neck pain and neck stiffness. Skin:  Negative for color change, pallor, rash and wound. Allergic/Immunologic: Negative. Neurological:  Negative for dizziness, speech difficulty, weakness and headaches. Hematological:  Does not bruise/bleed easily. Psychiatric/Behavioral:  Negative for behavioral problems. The patient is not nervous/anxious. Vitals:    04/24/23 0951   BP: (!) 195/93   Pulse: 62   Resp: 18   Temp: 98.8 °F (37.1 °C)   SpO2: 95%   Weight: 82.1 kg (181 lb)   Height: 5' 4\" (1.626 m)            Physical Exam  Vitals and nursing note reviewed. Constitutional:       General: She is not in acute distress. Appearance: Normal appearance. She is well-developed. She is not ill-appearing. HENT:      Head: Normocephalic and atraumatic. Right Ear: External ear normal.      Left Ear: External ear normal.      Nose: Nose normal. No congestion. Mouth/Throat:      Mouth: Mucous membranes are moist.   Eyes:      General:         Right eye: No discharge. Left eye: No discharge. Conjunctiva/sclera: Conjunctivae normal.      Pupils: Pupils are equal, round, and reactive to light. Neck:      Vascular: No JVD. Trachea: No tracheal deviation. Cardiovascular:      Rate and Rhythm: Normal rate and regular rhythm. Pulses: Normal pulses. Heart sounds: Normal heart sounds.  No murmur heard.    No gallop. Pulmonary:      Effort: Pulmonary effort is normal. No respiratory distress. Breath sounds: Normal breath sounds. Chest:      Chest wall: No tenderness. Abdominal:      General: Bowel sounds are normal. There is no distension. Palpations: Abdomen is soft. Tenderness: There is no abdominal tenderness. There is no guarding or rebound. Genitourinary:     Comments: Negative    Musculoskeletal:         General: Tenderness (left foot with increased pain and swelling,erythema to the left fourth and fifth toes. ) present. Normal range of motion. Cervical back: Normal range of motion and neck supple. No tenderness. Skin:     General: Skin is warm and dry. Capillary Refill: Capillary refill takes less than 2 seconds. Coloration: Skin is not pale. Findings: No erythema or rash. Neurological:      General: No focal deficit present. Mental Status: She is alert and oriented to person, place, and time. Motor: No weakness. Coordination: Coordination normal.   Psychiatric:         Mood and Affect: Mood normal.         Behavior: Behavior normal.         Thought Content: Thought content normal.         Judgment: Judgment normal.        Medical Decision Making  Differential diagnosis includes toe fractures, foot fractures, cellulitis and others. 69-year-old female who presents to the emergency room with complaints of left third and fourth toe pain, erythema and streaking to the left foot after striking her foot onto a breast bed. Patient is visually impaired and when she was walking in her bedroom she struck her toes on her breast because immediate pain, swelling, erythema. States she is able to weight-bear but has worsening pain. Positive palpable pulses, no neurological deficits. After physical assessment and review of laboratory data and imaging, patient was discharged home and will follow up with PCP.   Return to the emergency room with worsening symptoms. Patient in agreement with plan of care. Any available vitals, labs, images, nursing notes, medications, allergies, PMH, PSH and/or previous records in the chart were reviewed. All of these were considered in the medical decision making process. I individually reviewed any labs and any images obtained. This was discussed with my attending and he/she is in agreement with plan of care. Problems Addressed:  Cellulitis of toe of left foot: acute illness or injury  Closed fracture of phalanx of left fourth toe, initial encounter: acute illness or injury    Amount and/or Complexity of Data Reviewed  Labs: ordered. Radiology: ordered and independent interpretation performed. Decision-making details documented in ED Course. Risk  Prescription drug management. Labs Reviewed   METABOLIC PANEL, COMPREHENSIVE - Abnormal; Notable for the following components:       Result Value    Anion gap 1 (*)     Glucose 169 (*)     BUN 26 (*)     Creatinine 1.13 (*)     BUN/Creatinine ratio 23 (*)     eGFR 51 (*)     AST (SGOT) 14 (*)     A-G Ratio 1.0 (*)     All other components within normal limits   URINE CULTURE HOLD SAMPLE   CULTURE, BLOOD, PAIRED   CBC WITH AUTOMATED DIFF   SAMPLES BEING HELD   LACTIC ACID   POC LACTIC ACID       XR FOOT LT MIN 3 V    Result Date: 4/24/2023  Nondisplaced fracture of fourth toe proximal phalanx. 12:38 PM  Pt has been reexamined. Pt has no new complaints, changes or physical findings. Care plan outlined and precautions discussed. All available results were reviewed with pt. All medications were reviewed with pt. All of pt's questions and concerns were addressed. Pt agrees to F/U as instructed and agrees to return to ED upon further deterioration. Pt is ready to go home. Kat Moffett, CLEMENTE    Please note that this dictation was completed with WHILL, the [employer] voice recognition software.   Quite often unanticipated grammatical, syntax, homophones, and other interpretive errors are inadvertently transcribed by the computer software. Please disregard these errors. Please excuse any errors that have escaped final proofreading. Thank you.     Procedures

## 2023-04-29 LAB
BACTERIA SPEC CULT: NORMAL
SERVICE CMNT-IMP: NORMAL

## (undated) DEVICE — 3M™ IOBAN™ 2 ANTIMICROBIAL INCISE DRAPE 6650EZ: Brand: IOBAN™ 2

## (undated) DEVICE — DRAPE EQUIP C ARM 74X42 IN MOB XR W/ TIE RUBBER BND LF

## (undated) DEVICE — MEDI-TRACE CADENCE ADULT, DEFIBRILLATION ELECTRODE -RTS  (10 PR/PK) - PHYSIO-CONTROL: Brand: MEDI-TRACE CADENCE

## (undated) DEVICE — GUIDEWIRE VASC L80CM OD0.018IN TIP L2CM NIT COR TUNGSTEN

## (undated) DEVICE — COVER PRB INTOP 96X6 IN LF